# Patient Record
Sex: FEMALE | Race: BLACK OR AFRICAN AMERICAN
[De-identification: names, ages, dates, MRNs, and addresses within clinical notes are randomized per-mention and may not be internally consistent; named-entity substitution may affect disease eponyms.]

---

## 2017-01-19 ENCOUNTER — HOSPITAL ENCOUNTER (OUTPATIENT)
Dept: HOSPITAL 62 - RAD | Age: 67
End: 2017-01-19
Payer: MEDICARE

## 2017-01-19 DIAGNOSIS — M50.10: Primary | ICD-10-CM

## 2017-01-19 DIAGNOSIS — M54.2: ICD-10-CM

## 2017-01-19 PROCEDURE — 82565 ASSAY OF CREATININE: CPT

## 2017-01-19 PROCEDURE — A9577 INJ MULTIHANCE: HCPCS

## 2017-01-19 PROCEDURE — 72156 MRI NECK SPINE W/O & W/DYE: CPT

## 2017-06-06 ENCOUNTER — HOSPITAL ENCOUNTER (OUTPATIENT)
Dept: HOSPITAL 62 - WI | Age: 67
End: 2017-06-06
Attending: INTERNAL MEDICINE
Payer: MEDICARE

## 2017-06-06 DIAGNOSIS — Z12.31: Primary | ICD-10-CM

## 2017-06-06 PROCEDURE — G0202 SCR MAMMO BI INCL CAD: HCPCS

## 2017-06-06 PROCEDURE — 77067 SCR MAMMO BI INCL CAD: CPT

## 2017-06-06 PROCEDURE — 77063 BREAST TOMOSYNTHESIS BI: CPT

## 2017-06-07 NOTE — WOMENS IMAGING REPORT
EXAM DESCRIPTION:  3D SCREENING MAMMO BILAT



COMPLETED DATE/TIME:  6/6/2017 3:56 pm



REASON FOR STUDY:  ROUTINE SCREENING; Z12.31



COMPARISON:  Annual priors dating back to February 2009.



TECHNIQUE:  Standard craniocaudal and mediolateral oblique views of each breast recorded using digita
l acquisition and breast tomosynthesis.



LIMITATIONS:  None.



FINDINGS:  No masses, calcifications or architectural distortion. No areas of suspicion.

Read with the assistance of CAD.

.[facility] - R2 Cenova Version 1.3

.[facility] - R2 Cenova Version 1.3

.[facility] - R2 Cenova Version 2.4

.[facility] - R2 Cenova Version 2.4

.[facility] - R2  Version 9.2



IMPRESSION:  NORMAL MAMMOGRAM.  BIRADS 1.



BREAST DENSITY:  b. There are scattered areas of fibroglandular density.



BIRAD:  1 NEGATIVE



RECOMMENDATION:  ROUTINE SCREENING



COMMENT:  The patient has been notified of the results by letter per MQSA requirements. Additional no
tification policies are in place for contacting patient with suspicious or incomplete findings.

Quality ID #225: The American College of Radiology recommends an annual screening mammogram for women
 aged 40 years or over. This facility utilizes a reminder system to ensure that all patients receive 
reminder letters, and/or direct phone calls for appointments. This includes reminders for routine scr
eening mammograms, diagnostic mammograms, or other Breast Imaging Interventions when appropriate.  Th
is patient will be placed in the appropriate reminder system.

The American College of Radiology (ACR) has developed recommendations for screening MRI of the breast
s in certain patient populations, to be used in conjunction with mammography.  Breast MRI surveillanc
e may be appropriate for women with more than 20% lifetime risk of developing breast cancer  as deter
mined by genetic testing, significant family history of the disease, or history of mantle radiation f
or Hodgkins Disease.  ACR Practice Guidelines 2008.

[employer]



PQRS 6045F: Fluoroscopic imaging is not utilized for breast tomosynthesis.



TECHNICAL DOCUMENTATION:  FINDING NUMBER: (1)

ASSESSMENT:  (1)

JOB ID:  [id number]

 2011 [employer]- All Rights Reserved

## 2018-01-09 ENCOUNTER — HOSPITAL ENCOUNTER (OUTPATIENT)
Dept: HOSPITAL 62 - OD | Age: 68
End: 2018-01-09
Attending: INTERNAL MEDICINE
Payer: MEDICARE

## 2018-01-09 DIAGNOSIS — M47.896: ICD-10-CM

## 2018-01-09 DIAGNOSIS — M25.552: Primary | ICD-10-CM

## 2018-01-09 NOTE — RADIOLOGY REPORT (SQ)
EXAM DESCRIPTION:  HIP LEFT AP/LATERAL



COMPLETED DATE/TIME:  1/9/2018 1:52 pm



REASON FOR STUDY:  PAIN IN LEFT HIP M25.552  PAIN IN LEFT HIP



COMPARISON:  [facility] 12/8/2014



NUMBER OF VIEWS:  Two views.



TECHNIQUE:  AP pelvis and additional frog-leg view of the left hip.



LIMITATIONS:  None.



FINDINGS:  MINERALIZATION: Grossly normal bone density

LEFT HIP: No fracture or dislocation.  No worrisome bone lesions.

RIGHT HIP: No fracture or dislocation.  No worrisome bone lesions.

PUBIS AND ISCHIUM: No fracture.  On the left side, there is a 16 mm lytic lesion with faintly lobular
 sclerotic borders which could be a benign chondroid lesion.  This is similar compared to KUB 12/8/20
14.  Consider MRI for followup

PELVIS: No fracture.

SACRUM: No fracture or dislocation.  Mild bilateral SI joint sclerosis.

LOWER LUMBAR SPINE: Disc space narrowing with facet arthropathy at L4-5 and L5-S1

SOFT TISSUES: No findings.

OTHER: No other significant finding.



IMPRESSION:  No acute fracture or malalignment.  No significant hip joint space narrowing.

Incidental finding of a probably benign chondroid lesion in the left ischial tuberosity, similar on p
mee film compared to 12/8/2014

Degenerative changes lower lumbar spine



TECHNICAL DOCUMENTATION:  JOB ID:  [id number]

 2011 [employer]- All Rights Reserved

## 2018-06-11 ENCOUNTER — HOSPITAL ENCOUNTER (OUTPATIENT)
Dept: HOSPITAL 62 - WI | Age: 68
End: 2018-06-11
Attending: INTERNAL MEDICINE
Payer: MEDICARE

## 2018-06-11 DIAGNOSIS — Z12.31: Primary | ICD-10-CM

## 2018-06-11 PROCEDURE — 77063 BREAST TOMOSYNTHESIS BI: CPT

## 2018-06-11 PROCEDURE — 77067 SCR MAMMO BI INCL CAD: CPT

## 2018-06-14 NOTE — WOMENS IMAGING REPORT
EXAM DESCRIPTION:  3D SCREENING MAMMO BILAT



COMPLETED DATE/TIME:  6/11/2018 11:19 am



REASON FOR STUDY:  SCREENING MAMMO Z12.31  ENCNTR SCREEN MAMMOGRAM FOR MALIGNANT NEOPLASM OF 



COMPARISON:  Multiple since 2009



TECHNIQUE:  Standard craniocaudal and mediolateral oblique views of each breast recorded using digita
l acquisition and breast tomosynthesis.



LIMITATIONS:  None.



FINDINGS:  Findings present which are benign by mammographic criteria.  No suspicious masses, calcifi
cations or architectural distortion.

Pertinent benign findings: Stable benign breast parenchymal calcifications.

Read with the assistance of CAD.

.[facility] - R2 Cenova Version 1.3

.[facility] - R2 Cenova Version 1.3

.[facility] - R2 Cenova Version 2.4

.[facility] - R2 Cenova Version 2.4

.[facility] - R2  Version 9.2

Benign mammographic findings may include one or more of the following:  Smooth masses, popcorn/rim/co
arse calcifications, asymmetries, post-procedure changes, and lesions with long-standing stability.



IMPRESSION:  BENIGN MAMMOGRAPHIC FINDINGS.  BIRADS 2



BREAST DENSITY:  b. There are scattered areas of fibroglandular density.



BIRAD:  2 BENIGN FINDING(S)



RECOMMENDATION:  RECOMMENDATION: ROUTINE SCREENING

Please continue yearly bilateral screening tomosynthesis in June 2019



COMMENT:  The patient has been notified of the results by letter per SA requirements. Additional no
tification policies are in place for contacting patient with suspicious or incomplete findings.

Quality ID #225: The American College of Radiology recommends an annual screening mammogram for women
 aged 40 years or over. This facility utilizes a reminder system to ensure that all patients receive 
reminder letters, and/or direct phone calls for appointments. This includes reminders for routine scr
eening mammograms, diagnostic mammograms, or other Breast Imaging Interventions when appropriate.  Th
is patient will be placed in the appropriate reminder system.

The American College of Radiology (ACR) has developed recommendations for screening MRI of the breast
s in certain patient populations, to be used in conjunction with mammography.  Breast MRI surveillanc
e may be appropriate for women with more than 20% lifetime risk of developing breast cancer  as deter
mined by genetic testing, significant family history of the disease, or history of mantle radiation f
or Hodgkins Disease.  ACR Practice Guidelines 2008.

[employer]



PQRS 6045F: Fluoroscopic imaging is not utilized for breast tomosynthesis.



TECHNICAL DOCUMENTATION:  FINDING NUMBER: (1)

ASSESSMENT: (1)

JOB ID:  [id number]

 2011 [employer]- All Rights Reserved



Reading location - IP/workstation name: [facility]-RR2

## 2018-06-20 LAB
ANION GAP SERPL CALC-SCNC: 9 MMOL/L (ref 5–19)
APPEARANCE UR: CLEAR
APTT PPP: YELLOW S
BILIRUB UR QL STRIP: NEGATIVE
BUN SERPL-MCNC: 12 MG/DL (ref 7–20)
CALCIUM: 9.6 MG/DL (ref 8.4–10.2)
CHLORIDE SERPL-SCNC: 104 MMOL/L (ref 98–107)
CO2 SERPL-SCNC: 33 MMOL/L (ref 22–30)
ERYTHROCYTE [DISTWIDTH] IN BLOOD BY AUTOMATED COUNT: 15.5 % (ref 11.5–14)
GLUCOSE SERPL-MCNC: 142 MG/DL (ref 75–110)
GLUCOSE UR STRIP-MCNC: NEGATIVE MG/DL
HCT VFR BLD CALC: 40.2 % (ref 36–47)
HGB BLD-MCNC: 13.3 G/DL (ref 12–15.5)
KETONES UR STRIP-MCNC: NEGATIVE MG/DL
MCH RBC QN AUTO: 25.4 PG (ref 27–33.4)
MCHC RBC AUTO-ENTMCNC: 33 G/DL (ref 32–36)
MCV RBC AUTO: 77 FL (ref 80–97)
NITRITE UR QL STRIP: NEGATIVE
PH UR STRIP: 7 [PH] (ref 5–9)
PLATELET # BLD: 208 10^3/UL (ref 150–450)
POTASSIUM SERPL-SCNC: 4.1 MMOL/L (ref 3.6–5)
PROT UR STRIP-MCNC: NEGATIVE MG/DL
RBC # BLD AUTO: 5.24 10^6/UL (ref 3.72–5.28)
SODIUM SERPL-SCNC: 146.3 MMOL/L (ref 137–145)
SP GR UR STRIP: 1.01
UROBILINOGEN UR-MCNC: NEGATIVE MG/DL (ref ?–2)
WBC # BLD AUTO: 5.6 10^3/UL (ref 4–10.5)

## 2018-06-20 NOTE — RADIOLOGY REPORT (SQ)
EXAM DESCRIPTION:  CHEST PA/LATERAL



COMPLETED DATE/TIME:  6/20/2018 9:47 am



REASON FOR STUDY:  PRE-OP



COMPARISON:  CT chest 3/18/2016, 11/10/2014

Two-view chest 12/2/2013, 3/19/2013



EXAM PARAMETERS:  NUMBER OF VIEWS: two views

TECHNIQUE: Digital Frontal and Lateral radiographic views of the chest acquired.

RADIATION DOSE: NA

LIMITATIONS: none



FINDINGS:  LUNGS AND PLEURA: Chronic appearing mild left pleural thickening and scarring in the perip
aruna of the left lung is unchanged.

No acute infiltrates.  No pleural effusion.  No pneumothorax.

MEDIASTINUM AND HILAR STRUCTURES: Unchanged surgical clips left hilum

HEART AND VASCULAR STRUCTURES: Stable mild cardiomegaly

BONES: Osteopenic without thoracic compression deformity

HARDWARE: Right-sided permanent central line tip superior vena cava.  Clips right upper quadrant post
 cholecystectomy

OTHER: No other significant finding.



IMPRESSION:  No acute findings

Stable cardiomegaly and old postsurgical changes left chest



TECHNICAL DOCUMENTATION:  JOB ID:  [id number]

 2011 [employer]- All Rights Reserved



Reading location - IP/workstation name: [facility]-[email or web address]

## 2018-06-27 ENCOUNTER — HOSPITAL ENCOUNTER (OUTPATIENT)
Dept: HOSPITAL 62 - OROUT | Age: 68
Discharge: HOME | End: 2018-06-27
Attending: ORTHOPAEDIC SURGERY
Payer: MEDICARE

## 2018-06-27 VITALS — SYSTOLIC BLOOD PRESSURE: 126 MMHG | DIASTOLIC BLOOD PRESSURE: 78 MMHG

## 2018-06-27 DIAGNOSIS — E78.5: ICD-10-CM

## 2018-06-27 DIAGNOSIS — M23.203: ICD-10-CM

## 2018-06-27 DIAGNOSIS — Z79.84: ICD-10-CM

## 2018-06-27 DIAGNOSIS — I10: ICD-10-CM

## 2018-06-27 DIAGNOSIS — M22.41: Primary | ICD-10-CM

## 2018-06-27 DIAGNOSIS — Z88.0: ICD-10-CM

## 2018-06-27 DIAGNOSIS — Z79.899: ICD-10-CM

## 2018-06-27 DIAGNOSIS — E11.9: ICD-10-CM

## 2018-06-27 DIAGNOSIS — M23.200: ICD-10-CM

## 2018-06-27 PROCEDURE — 29880 ARTHRS KNE SRG MNISECTMY M&L: CPT

## 2018-06-27 PROCEDURE — 82962 GLUCOSE BLOOD TEST: CPT

## 2018-06-27 PROCEDURE — 81001 URINALYSIS AUTO W/SCOPE: CPT

## 2018-06-27 PROCEDURE — 93005 ELECTROCARDIOGRAM TRACING: CPT

## 2018-06-27 PROCEDURE — 71046 X-RAY EXAM CHEST 2 VIEWS: CPT

## 2018-06-27 PROCEDURE — 80048 BASIC METABOLIC PNL TOTAL CA: CPT

## 2018-06-27 PROCEDURE — 84132 ASSAY OF SERUM POTASSIUM: CPT

## 2018-06-27 PROCEDURE — 93010 ELECTROCARDIOGRAM REPORT: CPT

## 2018-06-27 PROCEDURE — 85027 COMPLETE CBC AUTOMATED: CPT

## 2018-06-27 PROCEDURE — 36415 COLL VENOUS BLD VENIPUNCTURE: CPT

## 2018-06-27 PROCEDURE — S0028 INJECTION, FAMOTIDINE, 20 MG: HCPCS

## 2018-06-27 RX ADMIN — FENTANYL CITRATE ONE MCG: 50 INJECTION INTRAMUSCULAR; INTRAVENOUS at 09:52

## 2018-06-27 RX ADMIN — FENTANYL CITRATE ONE MCG: 50 INJECTION INTRAMUSCULAR; INTRAVENOUS at 09:57

## 2018-06-27 NOTE — OPERATIVE REPORT
Operative Report


DATE OF SURGERY: 06/27/18


PREOPERATIVE DIAGNOSIS: Right medial meniscal tear


POSTOPERATIVE DIAGNOSIS: Right medial meniscal tear.  Grade I-II chondromalacia 

medial compartment.  Intact ACL.  Lateral meniscal tear.  Grade 1-2 chondral 

malacia lateral compartment.  Grade 1-2 combination of the patellofemoral 

compartment


OPERATION: Arthroscopic partial right medial and lateral meniscectomies


SURGEON: JUNAID NOEL


ANESTHESIA: LMAC


ESTIMATED BLOOD LOSS: Minimal


PROCEDURE: 





With the patient supine on the operating table the right lower extremity is 

prepped and draped in sterile fashion.  The knee is insufflated with 

combination of Marcaine, Xylocaine, and epinephrine.  Subsequently medial and 

lateral patella portals are created for the introduction of the arthroscope and 

debridement instrumentation.  Joint is examined in systematic fashion findings 

as above.  Using combination of basket Wilson, mechanical shaver, electric 

frequency ablation probe a partial medial meniscectomy was performed from 

approximately 3:00 to 12:00 in the face of the dial.





Similarly using the electric frequency ablation probable partial lateral 

meniscectomy performed from approximately 7:00 to 11:00 on the face of the dial.





The joint is examined in systematic fashion no new findings.  The 

instrumentation was removed.  The portals reapproximated interrupted nylon.  A 

sterile compressive dressings applied.  The patient's return to the PACU in 

satisfactory condition.

## 2018-06-29 ENCOUNTER — HOSPITAL ENCOUNTER (EMERGENCY)
Dept: HOSPITAL 62 - ER | Age: 68
Discharge: HOME | End: 2018-06-29
Payer: MEDICARE

## 2018-06-29 VITALS — DIASTOLIC BLOOD PRESSURE: 49 MMHG | SYSTOLIC BLOOD PRESSURE: 132 MMHG

## 2018-06-29 DIAGNOSIS — M96.830: Primary | ICD-10-CM

## 2018-06-29 DIAGNOSIS — I10: ICD-10-CM

## 2018-06-29 DIAGNOSIS — E78.00: ICD-10-CM

## 2018-06-29 DIAGNOSIS — Z88.0: ICD-10-CM

## 2018-06-29 DIAGNOSIS — Z90.49: ICD-10-CM

## 2018-06-29 DIAGNOSIS — Z90.710: ICD-10-CM

## 2018-06-29 DIAGNOSIS — E11.9: ICD-10-CM

## 2018-06-29 PROCEDURE — 99283 EMERGENCY DEPT VISIT LOW MDM: CPT

## 2018-06-29 NOTE — ER DOCUMENT REPORT
ED Medical Screen (RME)





- General


Chief Complaint: Post Surgical Bleeding


Stated Complaint: RIGHT KNEE PAIN


Time Seen by Provider: 18 14:06


Notes: 





RAPID MEDICAL EVALUATION DISCLOSURE


I have seen this patient as part of a Rapid Medical Evaluation and, if 

applicable, placed any initially appropriate orders. The patient will be seen 

and fully evaluated, including a full history and physical exam, by a provider (

in Main ED or Fast Track) when a room becomes available.





------------------------------------------------------------------





67-year-old female status post recent arthroscopic ligament repair by Dr. Mejia here with complaints of some postsurgical bleeding that she noted last 

night.  She has not had any today.  She continues to have pain from the surgery 

however it is not any worse.  Her doctor told her to come here for evaluation 

of his bleeding.





EXAM


Small amount of blood seen under dressing


No appreciable erythema or other signs of infection











TRAVEL OUTSIDE OF THE [place] IN LAST 30 DAYS: No





- Related Data


Allergies/Adverse Reactions: 


 





Penicillins Allergy (Severe, Verified 18 13:35)


 Hives


adhesive tape Allergy (Verified 18 13:35)


 BLISTERS ON SKIN


betamethasone [Betamethasone] Allergy (Verified 18 13:35)


 


clotrimazole [Clotrimazole] Allergy (Verified 18 13:35)


 











Past Medical History





- Past Medical History


Cardiac Medical History: Reports: Hx Hypercholesterolemia, Hx Hypertension


   Denies: Hx Atrial Fibrillation, Hx Congestive Heart Failure, Hx Coronary 

Artery Disease, Hx Heart Attack, Hx Peripheral Vascular Disease, Hx Pulmonary 

Embolism, Hx Heart Murmur


Pulmonary Medical History: Reports: Hx Asthma - ON INHALERS, Hx Bronchitis, Hx 

Pneumonia - A LONG TIME AGO


   Denies: Hx COPD, Hx Respiratory Failure, Hx Sleep Apnea


   Comment Only: Hx Tuberculosis - was treated because  was positive


Neurological Medical History: Denies: Hx Cerebrovascular Accident, Hx Seizures


Endocrine Medical History: Reports: Hx Diabetes Mellitus Type 2.  Denies: Hx 

Graves' Disease, Hx Hyperthyroidism, Hx Hypothyroidism


Renal/ Medical History: Denies: Hx End Stage Renal Disease, Hx Kidney Stones, 

Hx Ovarian Cysts, Hx Peritoneal Dialysis, Hx Pelvic Inflammatory Disease


Malignancy Medical History: Denies: Hx Breast Cancer, Hx Cervical Cancer, Hx 

Leukemia, Hx Lung Cancer, Hx Ovarian Cancer


GI Medical History: Reports: Hx Gastroesophageal Reflux Disease.  Denies: Hx 

Crohn's Disease, Hx Hiatal Hernia, Hx Irritable Bowel, Hx Liver Failure, Hx 

Pancreatitis, Hx Ulcer


Musculoskeltal Medical History: Reports Hx Arthritis, Reports Hx Fibromyalgia, 

Denies Hx Multiple Sclerosis, Denies Hx Muscular Dystrophy


Psychiatric Medical History: 


   Denies: Hx Bipolar Disorder, Hx Dementia, Hx Depression, Hx Post Traumatic 

Stress Disorder, Hx Schizophrenia


Traumatic Medical History: Reports: Hx Fractures - rt toe 


Infectious Medical History: Denies: Hx HIV


Past Surgical History: Reports: Hx Cholecystectomy, Hx Hysterectomy.  Denies: 

Hx Appendectomy, Hx Bowel Surgery, Hx  Section, Hx Colostomy, Hx 

Coronary Artery Bypass Graft, Hx Gastric Bypass Surgery, Hx Herniorrhaphy, Hx 

Mastectomy, Hx Pacemaker, Hx Tonsillectomy, Hx Tubal Ligation





- Immunizations


Hx Diphtheria, Pertussis, Tetanus Vaccination: Yes


History of Influenza Vaccine for 10/2017 - 3/2018 Season: Yes


Influenza Administration Date for 10/2017 - 3/2018 Season: 10/01/17





Physical Exam





- Vital signs


Vitals: 





 











Temp Pulse Resp BP Pulse Ox


 


 98.4 F   64   16   132/49 H  96 


 


 18 13:41  18 13:41  18 13:41  18 13:41  18 13:41














Course





- Vital Signs


Vital signs: 





 











Temp Pulse Resp BP Pulse Ox


 


 98.4 F   64   16   132/49 H  96 


 


 18 13:41  18 13:41  18 13:41  18 13:41  18 13:41














Doctor's Discharge





- Discharge


Referrals: 


JANICE DELAROSA MD [Primary Care Provider] - Follow up as needed

## 2018-06-29 NOTE — ER DOCUMENT REPORT
ED General





- General


Chief Complaint: Post Surgical Bleeding


Stated Complaint: RIGHT KNEE PAIN


Time Seen by Provider: 18 14:06


Mode of Arrival: Ambulatory


Information source: Patient


TRAVEL OUTSIDE OF THE [place] IN LAST 30 DAYS: No





- HPI


Patient complains to provider of: post op bleeding


Notes: 





Patient is here with complaints of postoperative bleeding.  The patient had 

arthroscopic knee surgery performed by Dr. Mejia 2 days ago.  She had a 

dressing applied.  She states that last evening she noticed that 1 of the 

incision sites have bled into the goals.  She states that it has not bled at 

all today.  The amount of bleeding that she sees on the dressing is the same 

that was evening.  She called the office, Dr. Mejia is not in and she was 

directed to come the emergency department for evaluation.  She denies any fever

, redness.  She denies any blood thinning meds.  She denies any numbness, 

tingling, weakness.  She does complain of some mild postoperative pain that her 

pain medication is taken care of.  She denies any rashes.  She denies any chest 

pain or shortness of breath.  No nausea, vomiting, diarrhea.  She denies any 

other complaints at this time.





- Related Data


Allergies/Adverse Reactions: 


 





Penicillins Allergy (Severe, Verified 18 13:35)


 Hives


adhesive tape Allergy (Verified 18 13:35)


 BLISTERS ON SKIN


betamethasone [Betamethasone] Allergy (Verified 18 13:35)


 


clotrimazole [Clotrimazole] Allergy (Verified 18 13:35)


 











Past Medical History





- Social History


Smoking Status: Never Smoker


Chew tobacco use (# tins/day): No


Frequency of alcohol use: None


Drug Abuse: None


Family History: Reviewed & Not Pertinent


Patient has suicidal ideation: No


Patient has homicidal ideation: No





- Past Medical History


Cardiac Medical History: Reports: Hx Hypercholesterolemia, Hx Hypertension


   Denies: Hx Atrial Fibrillation, Hx Congestive Heart Failure, Hx Coronary 

Artery Disease, Hx Heart Attack, Hx Peripheral Vascular Disease, Hx Pulmonary 

Embolism, Hx Heart Murmur


Pulmonary Medical History: Reports: Hx Asthma - ON INHALERS, Hx Bronchitis, Hx 

Pneumonia - A LONG TIME AGO


   Denies: Hx COPD, Hx Respiratory Failure, Hx Sleep Apnea


   Comment Only: Hx Tuberculosis - was treated because  was positive


Neurological Medical History: Denies: Hx Cerebrovascular Accident, Hx Seizures


Endocrine Medical History: Reports: Hx Diabetes Mellitus Type 2.  Denies: Hx 

Graves' Disease, Hx Hyperthyroidism, Hx Hypothyroidism


Renal/ Medical History: Denies: Hx End Stage Renal Disease, Hx Kidney Stones, 

Hx Ovarian Cysts, Hx Peritoneal Dialysis, Hx Pelvic Inflammatory Disease


Malignancy Medical History: Denies: Hx Breast Cancer, Hx Cervical Cancer, Hx 

Leukemia, Hx Lung Cancer, Hx Ovarian Cancer


GI Medical History: Reports: Hx Gastroesophageal Reflux Disease.  Denies: Hx 

Crohn's Disease, Hx Hiatal Hernia, Hx Irritable Bowel, Hx Liver Failure, Hx 

Pancreatitis, Hx Ulcer


Musculoskeltal Medical History: Reports Hx Arthritis, Reports Hx Fibromyalgia, 

Denies Hx Multiple Sclerosis, Denies Hx Muscular Dystrophy


Psychiatric Medical History: 


   Denies: Hx Bipolar Disorder, Hx Dementia, Hx Depression, Hx Post Traumatic 

Stress Disorder, Hx Schizophrenia


Traumatic Medical History: Reports: Hx Fractures - rt toe 


Infectious Medical History: Denies: Hx HIV


Past Surgical History: Reports: Hx Cholecystectomy, Hx Hysterectomy, Hx 

Orthopedic Surgery - right knee.  Denies: Hx Appendectomy, Hx Bowel Surgery, Hx 

 Section, Hx Colostomy, Hx Coronary Artery Bypass Graft, Hx Gastric 

Bypass Surgery, Hx Herniorrhaphy, Hx Mastectomy, Hx Pacemaker, Hx Tonsillectomy

, Hx Tubal Ligation





- Immunizations


Hx Diphtheria, Pertussis, Tetanus Vaccination: Yes


Hx Pneumococcal Vaccination: 01/01/10





Review of Systems





- Review of Systems


-: Yes All other systems reviewed and negative





Physical Exam





- Vital signs


Vitals: 





 











Temp Pulse Resp BP Pulse Ox


 


 98.4 F   64   16   132/49 H  96 


 


 18 13:41  18 13:41  18 13:41  18 13:41  18 13:41














- Notes


Notes: 





GENERAL: alert, cooperative, nontoxic, no distress.


HEAD: normocephalic, atraumatic


EYES: conjunctiva pink without discharge, no external redness or swelling.


EARS: no external swelling, no external redness


NOSE: atraumatic, no external swelling


MOUTH/THROAT: mucous membranes moist and pink


NECK: soft, supple, full range of motion, no meningismus.


CHEST: no distress, lungs clear and equal throughout.  No wheezing, rales, 

rhonchi.


CARDIAC: regular rate and rhythm, no murmur, normal capillary refill, normal 

pulses.  


BACK: full range of motion, no CVA tenderness.


EXTREMITIES: full range of motion of all extremities.  No redness, no swelling.

  Postoperative dressing noted to the right knee.  The lateral incision gauze 

is noted to be saturated with blood.  There is no active bleeding noted.  No 

bleeding from the medial incision.  The dressing was removed to further 

evaluate for any continuous bleeding, there is no active bleeding noted at this 

time.  There is no redness or drainage.  There is no significant tenderness.  

She is neurovascularly intact at this time.


NEURO: alert and oriented 3, no focal deficits, full range of motion of all 

extremities.


PYSCH: appropriate mood, affect.  Patient is cooperative.


SKIN: pink, warm, dry, no rash.








Course





- Re-evaluation


Re-evalutation: 





18 15:07


Patient is here with complaints of postoperative bleeding.  The patient had 

arthroscopic knee surgery by Dr. Mejia 2 days ago and noticed some bleeding to 

the lateral incision last night.  It has not bled any further today.  She is 

not on blood thinning medications.  She denies any fever.  She has had some 

mild postoperative pain which her pain medication is taking care of.  On exam 

she had blood on the lateral incision galls, but no active bleeding.  Dressing 

was removed.  Wounds were cleaned.  Telfa dressing was applied.  A new Tegaderm 

was applied and the patient will be discharged home.  There is no signs of 

infection or other serious problem at this time.  She is instructed to follow-

up with Dr. Mejia as scheduled next week.  Follow-up sooner for increasing pain

, fever, redness, drainage, bleeding she cannot control, or for any further 

concerns.





The patient is noted to have elevated blood pressure during today's emergency 

department visit.  The patient was informed of this finding.  The patient was 

instructed that this may be related to pre-hypertension and requires further 

evaluation with a primary care provider.  The patient has no hypertensive 

symptoms at this time.





The patient's emergency department workup and current diagnosis were explained 

to the patient and or family.  Follow-up instructions were provided.  

Medications if prescribed were discussed. Instructions for when to return to 

the emergency department including specific  worrisome symptoms were discussed 

with the patient and/or family.





- Vital Signs


Vital signs: 





 











Temp Pulse Resp BP Pulse Ox


 


 98.4 F   64   16   132/49 H  96 


 


 18 13:41  18 13:41  18 13:41  18 13:41  18 13:41














Discharge





- Discharge


Clinical Impression: 


Post-op bleeding


Qualifiers:


 Surgical complication system/body Area: musculoskeletal system Procedure type: 

musculoskeletal Qualified Code(s): M96.830 - Postprocedural hemorrhage of a 

musculoskeletal structure following a musculoskeletal system procedure





Condition: Stable


Disposition: HOME, SELF-CARE


Additional Instructions: 


Keep wound clean and dry.  Keep dressing in place.  Follow-up with Dr. Mejia 

as scheduled next week.  Follow-up sooner for increasing pain, fever, redness, 

drainage, numbness, tingling, weakness, any further concerns.





Your blood pressure was elevated during today's visit.  Have this rechecked 

with your doctor.


Forms:  Elevated Blood Pressure, Smoking Cessation Education


Referrals: 


JANICE DELAROSA MD [Primary Care Provider] - Follow up as needed

## 2019-03-25 ENCOUNTER — HOSPITAL ENCOUNTER (OUTPATIENT)
Dept: HOSPITAL 62 - RAD | Age: 69
End: 2019-03-25
Attending: INTERNAL MEDICINE
Payer: MEDICARE

## 2019-03-25 DIAGNOSIS — R07.9: Primary | ICD-10-CM

## 2019-03-25 DIAGNOSIS — R91.8: ICD-10-CM

## 2019-03-25 PROCEDURE — 71275 CT ANGIOGRAPHY CHEST: CPT

## 2019-03-25 PROCEDURE — 82565 ASSAY OF CREATININE: CPT

## 2019-03-25 NOTE — RADIOLOGY REPORT (SQ)
EXAM DESCRIPTION:  CTA CHEST



COMPLETED DATE/TIME:  3/25/2019 12:31 pm



REASON FOR STUDY:  R07.9 CHEST PAIN, UNSPECIFIED R07.9  CHEST PAIN, UNSPECIFIED



COMPARISON:  3/18/2016



TECHNIQUE:  CT scan of the chest performed using helical scanning technique with dynamic intravenous 
contrast injection.  Images reviewed with lung, soft tissue and bone windows.  Reconstructed coronal 
and sagittal MPR images reviewed.

Additional 3 dimensional post-processing performed to develop Maximal Intensity Projection images (MI
P).  All images stored on PACS.

All CT scanners at this facility use dose modulation, iterative reconstruction, and/or weight based d
osing when appropriate to reduce radiation dose to as low as reasonably achievable (ALARA).

[facility]: Dose Right  [facility]: CareDose    [facility]: Dose Right    [facility]: Teradose 4D    [facility]: Smart Technologies



CONTRAST TYPE AND DOSE:  contrast/concentration: Isovue 350.00 mg/ml; Total Contrast Delivered: 73.0 
ml; Total Saline Delivered: 110.0 ml

Contrast bolus optimized for the pulmonary arteries. Not diagnostic for the aorta.



RENAL FUNCTION:  GFR > 60.



RADIATION DOSE:  CT Rad equipment meets quality standard of care and radiation dose reduction techniq
ues were employed. CTDIvol: 9.4 - 14.8 mGy. DLP: 538 mGy-cm. .



LIMITATIONS:  None.



FINDINGS:  LUNGS AND PLEURA: No masses, infiltrates, or pneumothorax.  No pleural effusions or pleura
l calcifications.

AORTA AND GREAT VESSELS: No aneurysm.  Contrast bolus not optimized for the aorta.

HEART: No pericardial effusion.

PULMONARY ARTERIES: No emboli visualized in the main pulmonary arteries or the segmental branches.

HILAR AND MEDIASTINAL STRUCTURES: Left hilar mass adjacent to surgical clips 5.1 x 5.0 cm AP by trans
verse diameter, previously 3.7 x 4.7 cm.

HARDWARE: None in the chest.

UPPER ABDOMEN: No significant findings.  Limited exam.

THYROID AND OTHER SOFT TISSUES: No masses.  No adenopathy.

BONES: Nothing acute.

3D MIPS: Confirm above findings.

OTHER: No other significant finding.



IMPRESSION:

1. No PE.

2. Chronic left hilar mass/adenopathy.



COMMENT:  Quality ID # [id number]: Final reports with documentation of one or more dose reduction techniques
 (e.g., Automated exposure control, adjustment of the mA and/or kV according to patient size, use of 
iterative reconstruction technique)



TECHNICAL DOCUMENTATION:  JOB ID:  [id number]

 2011 [employer]- All Rights Reserved



Reading location - IP/workstation name: [email or web address]

## 2019-09-04 ENCOUNTER — HOSPITAL ENCOUNTER (OUTPATIENT)
Dept: HOSPITAL 62 - WI | Age: 69
End: 2019-09-04
Attending: INTERNAL MEDICINE
Payer: MEDICARE

## 2019-09-04 DIAGNOSIS — Z12.31: Primary | ICD-10-CM

## 2019-09-04 PROCEDURE — 77067 SCR MAMMO BI INCL CAD: CPT

## 2019-09-04 PROCEDURE — 77063 BREAST TOMOSYNTHESIS BI: CPT

## 2019-09-05 NOTE — WOMENS IMAGING REPORT
EXAM DESCRIPTION:  3D SCREENING MAMMO BILAT



COMPLETED DATE/TIME:  9/4/2019 2:51 pm



REASON FOR STUDY:  Z12.31 SCREENING MAMMO Z12.31  ENCNTR SCREEN MAMMOGRAM FOR MALIGNANT NEOPLASM OF B
RE



COMPARISON:  [id number]



EXAM PARAMETERS:  Views: Standard craniocaudal and mediolateral oblique views of each breast recorded
 using digital acquisition and breast tomosynthesis.

Read with the assistance of CAD.

.[facility] - [facility]  Version 9.2



LIMITATIONS:  Right battery pack.



FINDINGS:  No suspicious masses, suspicious calcifications or architectural distortion. No areas of c
oncern.



IMPRESSION:   NEGATIVE MAMMOGRAM. BIRADS 1.



BREAST DENSITY:  b. There are scattered areas of fibroglandular density.



BIRAD:  ASSESSMENT:  1 NEGATIVE



RECOMMENDATION:  ROUTINE SCREENING



COMMENT:  The patient has been notified of the results by letter per MQSA requirements. Additional no
tification policies are in place for contacting patient with suspicious or incomplete findings.

Quality ID #225: The [employer] recommends an annual screening mammogram for women
 aged 40 years or over. This facility utilizes a reminder system to ensure that all patients receive 
reminder letters, and/or direct phone calls for appointments. This includes reminders for routine scr
eening mammograms, diagnostic mammograms, or other Breast Imaging Interventions when appropriate.  Th
is patient will be placed in the appropriate reminder system.



TECHNICAL DOCUMENTATION:  FINDING NUMBER: (1)

ASSESSMENT:  (1)

JOB ID:  [id number]

 2011 [employer]- All Rights Reserved



Reading location - IP/workstation name: [email or web address]

## 2019-09-18 ENCOUNTER — HOSPITAL ENCOUNTER (OUTPATIENT)
Dept: HOSPITAL 62 - SC | Age: 69
Discharge: HOME | End: 2019-09-18
Attending: OPHTHALMOLOGY
Payer: MEDICARE

## 2019-09-18 DIAGNOSIS — E11.9: ICD-10-CM

## 2019-09-18 DIAGNOSIS — D86.9: ICD-10-CM

## 2019-09-18 DIAGNOSIS — M79.7: ICD-10-CM

## 2019-09-18 DIAGNOSIS — K21.9: ICD-10-CM

## 2019-09-18 DIAGNOSIS — E78.00: ICD-10-CM

## 2019-09-18 DIAGNOSIS — Z88.0: ICD-10-CM

## 2019-09-18 DIAGNOSIS — Z79.84: ICD-10-CM

## 2019-09-18 DIAGNOSIS — M06.9: ICD-10-CM

## 2019-09-18 DIAGNOSIS — I10: ICD-10-CM

## 2019-09-18 DIAGNOSIS — H25.11: Primary | ICD-10-CM

## 2019-09-18 DIAGNOSIS — Z79.899: ICD-10-CM

## 2019-09-18 PROCEDURE — 82962 GLUCOSE BLOOD TEST: CPT

## 2019-09-18 PROCEDURE — V2632 POST CHMBR INTRAOCULAR LENS: HCPCS

## 2019-09-18 PROCEDURE — 00142 ANES PX ON EYE LENS SURGERY: CPT

## 2019-09-18 PROCEDURE — 66984 XCAPSL CTRC RMVL W/O ECP: CPT

## 2019-09-18 RX ADMIN — BESIFLOXACIN PRN DROP: 6 SUSPENSION OPHTHALMIC at 00:00

## 2019-09-18 RX ADMIN — EPINEPHRINE ONE MG: 1 INJECTION, SOLUTION, CONCENTRATE INTRAVENOUS at 00:00

## 2019-09-18 RX ADMIN — TETRACAINE HYDROCHLORIDE PRN DROP: 5 SOLUTION OPHTHALMIC at 09:17

## 2019-09-18 RX ADMIN — CYCLOPENTOLATE HYDROCHLORIDE AND PHENYLEPHRINE HYDROCHLORIDE PRN DROP: 2; 10 SOLUTION/ DROPS OPHTHALMIC at 09:39

## 2019-09-18 RX ADMIN — TETRACAINE HYDROCHLORIDE PRN DROP: 5 SOLUTION OPHTHALMIC at 09:52

## 2019-09-18 RX ADMIN — DORZOLAMIDE HYDROCHLORIDE AND TIMOLOL MALEATE PRN DROP: 20; 5 SOLUTION OPHTHALMIC at 10:12

## 2019-09-18 RX ADMIN — TETRACAINE HYDROCHLORIDE PRN DROP: 5 SOLUTION OPHTHALMIC at 09:19

## 2019-09-18 RX ADMIN — TROPICAMIDE PRN DROP: 10 SOLUTION/ DROPS OPHTHALMIC at 09:39

## 2019-09-18 RX ADMIN — TOBRAMYCIN AND DEXAMETHASONE ONE APPLIC: 3; 1 OINTMENT OPHTHALMIC at 00:00

## 2019-09-18 RX ADMIN — SODIUM CHONDROITIN SULFATE / SODIUM HYALURONATE ONE EACH: 0.55-0.5 INJECTION INTRAOCULAR at 00:00

## 2019-09-18 RX ADMIN — HEPARIN SODIUM ONE ML: 1000 INJECTION, SOLUTION INTRAVENOUS; SUBCUTANEOUS at 10:02

## 2019-09-18 RX ADMIN — TETRACAINE HYDROCHLORIDE PRN DROP: 5 SOLUTION OPHTHALMIC at 09:39

## 2019-09-18 RX ADMIN — BESIFLOXACIN PRN DROP: 6 SUSPENSION OPHTHALMIC at 10:12

## 2019-09-18 RX ADMIN — DORZOLAMIDE HYDROCHLORIDE AND TIMOLOL MALEATE PRN DROP: 20; 5 SOLUTION OPHTHALMIC at 00:00

## 2019-09-18 RX ADMIN — CYCLOPENTOLATE HYDROCHLORIDE AND PHENYLEPHRINE HYDROCHLORIDE PRN DROP: 2; 10 SOLUTION/ DROPS OPHTHALMIC at 09:28

## 2019-09-18 RX ADMIN — HEPARIN SODIUM ONE ML: 1000 INJECTION, SOLUTION INTRAVENOUS; SUBCUTANEOUS at 00:00

## 2019-09-18 RX ADMIN — TROPICAMIDE PRN DROP: 10 SOLUTION/ DROPS OPHTHALMIC at 09:18

## 2019-09-18 RX ADMIN — BESIFLOXACIN PRN DROP: 6 SUSPENSION OPHTHALMIC at 09:29

## 2019-09-18 RX ADMIN — SODIUM CHONDROITIN SULFATE / SODIUM HYALURONATE ONE EACH: 0.55-0.5 INJECTION INTRAOCULAR at 10:02

## 2019-09-18 RX ADMIN — TOBRAMYCIN AND DEXAMETHASONE ONE APPLIC: 3; 1 OINTMENT OPHTHALMIC at 10:12

## 2019-09-18 RX ADMIN — EPINEPHRINE ONE MG: 1 INJECTION, SOLUTION, CONCENTRATE INTRAVENOUS at 10:02

## 2019-09-18 RX ADMIN — BESIFLOXACIN PRN DROP: 6 SUSPENSION OPHTHALMIC at 09:18

## 2019-09-18 RX ADMIN — TROPICAMIDE PRN DROP: 10 SOLUTION/ DROPS OPHTHALMIC at 09:28

## 2019-09-18 RX ADMIN — CYCLOPENTOLATE HYDROCHLORIDE AND PHENYLEPHRINE HYDROCHLORIDE PRN DROP: 2; 10 SOLUTION/ DROPS OPHTHALMIC at 09:18

## 2019-10-09 ENCOUNTER — HOSPITAL ENCOUNTER (OUTPATIENT)
Dept: HOSPITAL 62 - SC | Age: 69
Discharge: HOME | End: 2019-10-09
Attending: OPHTHALMOLOGY
Payer: MEDICARE

## 2019-10-09 DIAGNOSIS — E11.9: ICD-10-CM

## 2019-10-09 DIAGNOSIS — Z98.41: ICD-10-CM

## 2019-10-09 DIAGNOSIS — K21.9: ICD-10-CM

## 2019-10-09 DIAGNOSIS — I10: ICD-10-CM

## 2019-10-09 DIAGNOSIS — Z79.899: ICD-10-CM

## 2019-10-09 DIAGNOSIS — H25.12: Primary | ICD-10-CM

## 2019-10-09 DIAGNOSIS — D86.9: ICD-10-CM

## 2019-10-09 DIAGNOSIS — E78.00: ICD-10-CM

## 2019-10-09 DIAGNOSIS — Z79.84: ICD-10-CM

## 2019-10-09 PROCEDURE — 66984 XCAPSL CTRC RMVL W/O ECP: CPT

## 2019-10-09 PROCEDURE — V2632 POST CHMBR INTRAOCULAR LENS: HCPCS

## 2019-10-09 PROCEDURE — 82962 GLUCOSE BLOOD TEST: CPT

## 2019-10-09 RX ADMIN — TETRACAINE HYDROCHLORIDE PRN DROP: 5 SOLUTION OPHTHALMIC at 09:47

## 2019-10-09 RX ADMIN — TETRACAINE HYDROCHLORIDE PRN DROP: 5 SOLUTION OPHTHALMIC at 10:27

## 2019-10-09 RX ADMIN — CYCLOPENTOLATE HYDROCHLORIDE AND PHENYLEPHRINE HYDROCHLORIDE PRN DROP: 2; 10 SOLUTION/ DROPS OPHTHALMIC at 09:56

## 2019-10-09 RX ADMIN — BESIFLOXACIN PRN DROP: 6 SUSPENSION OPHTHALMIC at 09:46

## 2019-10-09 RX ADMIN — TROPICAMIDE PRN DROP: 10 SOLUTION/ DROPS OPHTHALMIC at 09:56

## 2019-10-09 RX ADMIN — CYCLOPENTOLATE HYDROCHLORIDE AND PHENYLEPHRINE HYDROCHLORIDE PRN DROP: 2; 10 SOLUTION/ DROPS OPHTHALMIC at 10:06

## 2019-10-09 RX ADMIN — BESIFLOXACIN PRN DROP: 6 SUSPENSION OPHTHALMIC at 10:45

## 2019-10-09 RX ADMIN — BESIFLOXACIN PRN DROP: 6 SUSPENSION OPHTHALMIC at 10:06

## 2019-10-09 RX ADMIN — CYCLOPENTOLATE HYDROCHLORIDE AND PHENYLEPHRINE HYDROCHLORIDE PRN DROP: 2; 10 SOLUTION/ DROPS OPHTHALMIC at 09:46

## 2019-10-09 RX ADMIN — TROPICAMIDE PRN DROP: 10 SOLUTION/ DROPS OPHTHALMIC at 10:06

## 2019-10-09 RX ADMIN — TROPICAMIDE PRN DROP: 10 SOLUTION/ DROPS OPHTHALMIC at 09:46

## 2019-10-09 RX ADMIN — TETRACAINE HYDROCHLORIDE PRN DROP: 5 SOLUTION OPHTHALMIC at 10:06

## 2020-10-14 ENCOUNTER — HOSPITAL ENCOUNTER (OUTPATIENT)
Dept: HOSPITAL 62 - WI | Age: 70
End: 2020-10-14
Attending: INTERNAL MEDICINE
Payer: MEDICARE

## 2020-10-14 DIAGNOSIS — Z12.31: Primary | ICD-10-CM

## 2020-10-14 PROCEDURE — 77067 SCR MAMMO BI INCL CAD: CPT

## 2020-10-14 PROCEDURE — 77063 BREAST TOMOSYNTHESIS BI: CPT

## 2020-10-14 NOTE — WOMENS IMAGING REPORT
EXAM DESCRIPTION:  3D SCREENING MAMMO BILAT



IMAGES COMPLETED DATE/TIME:  10/14/2020 2:14 pm



REASON FOR STUDY:  Z12.31 ENCNTR SCREEN MAMMOGRAM FOR MALIGNANT NEOPLASM OF BREAST Z12.31  ENCNTR SCR
EEN MAMMOGRAM FOR MALIGNANT NEOPLASM OF 



COMPARISON:  9/4/2019 and 6/11/2018.



EXAM PARAMETERS:  Views: Standard craniocaudal and mediolateral oblique views of each breast recorded
 using digital acquisition and breast tomosynthesis.

Read with the assistance of CAD.

.[facility] [place]  Version 9.2



LIMITATIONS:  None.



FINDINGS:  No suspicious masses, suspicious calcifications or architectural distortion. No areas of c
oncern.



IMPRESSION:   NEGATIVE MAMMOGRAM. BIRADS 1.



BREAST DENSITY:  b. There are scattered areas of fibroglandular density.



BIRAD:  ASSESSMENT:  1 NEGATIVE



RECOMMENDATION:  ROUTINE SCREENING



COMMENT:  The patient has been notified of the results by letter per MQSA requirements. Additional no
tification policies are in place for contacting patient with suspicious or incomplete findings.

Quality ID #225: The [employer] recommends an annual screening mammogram for women
 aged 40 years or over. This facility utilizes a reminder system to ensure that all patients receive 
reminder letters, and/or direct phone calls for appointments. This includes reminders for routine scr
eening mammograms, diagnostic mammograms, or other Breast Imaging Interventions when appropriate.  Th
is patient will be placed in the appropriate reminder system.



TECHNICAL DOCUMENTATION:  FINDING NUMBER: (1)

ASSESSMENT:  (1)

JOB ID:  [id number]

 2011 [employer]- All Rights Reserved



Reading location - IP/workstation name: [email or web address]